# Patient Record
Sex: MALE | Race: WHITE | URBAN - METROPOLITAN AREA
[De-identification: names, ages, dates, MRNs, and addresses within clinical notes are randomized per-mention and may not be internally consistent; named-entity substitution may affect disease eponyms.]

---

## 2017-01-20 ENCOUNTER — ANESTHESIA EVENT (OUTPATIENT)
Dept: GASTROENTEROLOGY | Facility: CLINIC | Age: 52
End: 2017-01-20
Payer: COMMERCIAL

## 2017-01-23 ENCOUNTER — ANESTHESIA (OUTPATIENT)
Dept: GASTROENTEROLOGY | Facility: CLINIC | Age: 52
End: 2017-01-23
Payer: COMMERCIAL

## 2017-01-23 ENCOUNTER — SURGERY (OUTPATIENT)
Age: 52
End: 2017-01-23

## 2017-01-23 PROCEDURE — 25000125 ZZHC RX 250: Performed by: NURSE ANESTHETIST, CERTIFIED REGISTERED

## 2017-01-23 RX ORDER — PROPOFOL 10 MG/ML
INJECTION, EMULSION INTRAVENOUS CONTINUOUS PRN
Status: DISCONTINUED | OUTPATIENT
Start: 2017-01-23 | End: 2017-01-23

## 2017-01-23 RX ORDER — PROPOFOL 10 MG/ML
INJECTION, EMULSION INTRAVENOUS PRN
Status: DISCONTINUED | OUTPATIENT
Start: 2017-01-23 | End: 2017-01-23

## 2017-01-23 RX ORDER — GLYCOPYRROLATE 0.2 MG/ML
INJECTION, SOLUTION INTRAMUSCULAR; INTRAVENOUS PRN
Status: DISCONTINUED | OUTPATIENT
Start: 2017-01-23 | End: 2017-01-23

## 2017-01-23 RX ADMIN — PROPOFOL 30 MG: 10 INJECTION, EMULSION INTRAVENOUS at 09:38

## 2017-01-23 RX ADMIN — PROPOFOL 30 MG: 10 INJECTION, EMULSION INTRAVENOUS at 09:37

## 2017-01-23 RX ADMIN — PROPOFOL 30 MG: 10 INJECTION, EMULSION INTRAVENOUS at 09:39

## 2017-01-23 RX ADMIN — GLYCOPYRROLATE 0.2 MG: 0.2 INJECTION, SOLUTION INTRAMUSCULAR; INTRAVENOUS at 09:38

## 2017-01-23 RX ADMIN — PROPOFOL 200 MCG/KG/MIN: 10 INJECTION, EMULSION INTRAVENOUS at 09:39

## 2017-01-23 RX ADMIN — PROPOFOL 10 MG: 10 INJECTION, EMULSION INTRAVENOUS at 09:40

## 2017-01-23 NOTE — ANESTHESIA POSTPROCEDURE EVALUATION
Patient: Marcial Smith    COLONOSCOPY (N/A Rectum)  Additional InformationProcedure(s):  Colonoscopy - Wound Class: II-Clean Contaminated    Diagnosis:screening  Diagnosis Additional Information: No value filed.    Anesthesia Type:  MAC    Note:  Anesthesia Post Evaluation    Patient location during evaluation: Phase 2  Patient participation: Able to fully participate in evaluation  Level of consciousness: awake  Pain management: adequate  Airway patency: patent  Cardiovascular status: acceptable  Respiratory status: acceptable  Hydration status: acceptable  PONV: none     Anesthetic complications: None          Last vitals:  Filed Vitals:    01/23/17 0841   BP: 132/86   Pulse: 64   Temp: 36.8  C (98.2  F)   Resp: 18   SpO2: 95%       Electronically Signed By: Earle Irving CRNA, APRN CRNA  January 23, 2017  10:01 AM

## 2017-01-23 NOTE — ANESTHESIA CARE TRANSFER NOTE
Patient: Marcial Smith    COLONOSCOPY (N/A Rectum)  Additional InformationProcedure(s):  Colonoscopy - Wound Class: II-Clean Contaminated    Diagnosis: screening  Diagnosis Additional Information: No value filed.    Anesthesia Type:   MAC     Note:    Patient transferred to:Phase II        Vitals: (Last set prior to Anesthesia Care Transfer)              Electronically Signed By: Earle Irving CRNA, APRN CRNA  January 23, 2017  10:00 AM